# Patient Record
Sex: MALE | Race: WHITE | NOT HISPANIC OR LATINO | Employment: OTHER | URBAN - METROPOLITAN AREA
[De-identification: names, ages, dates, MRNs, and addresses within clinical notes are randomized per-mention and may not be internally consistent; named-entity substitution may affect disease eponyms.]

---

## 2022-12-05 ENCOUNTER — HOSPITAL ENCOUNTER (OUTPATIENT)
Dept: DATA CONVERSION | Facility: HOSPITAL | Age: 67
End: 2022-12-06
Attending: SURGERY | Admitting: SURGERY

## 2022-12-05 DIAGNOSIS — C22.7 OTHER SPECIFIED CARCINOMAS OF LIVER (MULTI): ICD-10-CM

## 2022-12-05 DIAGNOSIS — I10 ESSENTIAL (PRIMARY) HYPERTENSION: ICD-10-CM

## 2022-12-05 DIAGNOSIS — E78.5 HYPERLIPIDEMIA, UNSPECIFIED: ICD-10-CM

## 2022-12-05 DIAGNOSIS — Z87.891 PERSONAL HISTORY OF NICOTINE DEPENDENCE: ICD-10-CM

## 2022-12-05 DIAGNOSIS — R93.2 ABNORMAL FINDINGS ON DIAGNOSTIC IMAGING OF LIVER AND BILIARY TRACT: ICD-10-CM

## 2022-12-05 DIAGNOSIS — C24.1 MALIGNANT NEOPLASM OF AMPULLA OF VATER (MULTI): ICD-10-CM

## 2022-12-05 DIAGNOSIS — C78.7 SECONDARY MALIGNANT NEOPLASM OF LIVER AND INTRAHEPATIC BILE DUCT (MULTI): ICD-10-CM

## 2022-12-05 LAB
POCT GLUCOSE: 107 MG/DL (ref 74–99)
POCT GLUCOSE: 142 MG/DL (ref 74–99)
POCT GLUCOSE: 280 MG/DL (ref 74–99)

## 2022-12-06 LAB
ALBUMIN (G/DL) IN SER/PLAS: 4.1 G/DL (ref 3.4–5)
ANION GAP IN SER/PLAS: 16 MMOL/L (ref 10–20)
BASOPHILS (10*3/UL) IN BLOOD BY AUTOMATED COUNT: 0.03 X10E9/L (ref 0–0.1)
BASOPHILS/100 LEUKOCYTES IN BLOOD BY AUTOMATED COUNT: 0.2 % (ref 0–2)
CALCIUM (MG/DL) IN SER/PLAS: 9.5 MG/DL (ref 8.6–10.6)
CARBON DIOXIDE, TOTAL (MMOL/L) IN SER/PLAS: 25 MMOL/L (ref 21–32)
CHLORIDE (MMOL/L) IN SER/PLAS: 98 MMOL/L (ref 98–107)
CREATININE (MG/DL) IN SER/PLAS: 1.13 MG/DL (ref 0.5–1.3)
EOSINOPHILS (10*3/UL) IN BLOOD BY AUTOMATED COUNT: 0.01 X10E9/L (ref 0–0.7)
EOSINOPHILS/100 LEUKOCYTES IN BLOOD BY AUTOMATED COUNT: 0.1 % (ref 0–6)
ERYTHROCYTE DISTRIBUTION WIDTH (RATIO) BY AUTOMATED COUNT: 13.7 % (ref 11.5–14.5)
ERYTHROCYTE MEAN CORPUSCULAR HEMOGLOBIN CONCENTRATION (G/DL) BY AUTOMATED: 34.3 G/DL (ref 32–36)
ERYTHROCYTE MEAN CORPUSCULAR VOLUME (FL) BY AUTOMATED COUNT: 95 FL (ref 80–100)
ERYTHROCYTES (10*6/UL) IN BLOOD BY AUTOMATED COUNT: 3.73 X10E12/L (ref 4.5–5.9)
GFR MALE: 71 ML/MIN/1.73M2
GLUCOSE (MG/DL) IN SER/PLAS: 324 MG/DL (ref 74–99)
HEMATOCRIT (%) IN BLOOD BY AUTOMATED COUNT: 35.3 % (ref 41–52)
HEMOGLOBIN (G/DL) IN BLOOD: 12.1 G/DL (ref 13.5–17.5)
IMMATURE GRANULOCYTES/100 LEUKOCYTES IN BLOOD BY AUTOMATED COUNT: 0.5 % (ref 0–0.9)
LEUKOCYTES (10*3/UL) IN BLOOD BY AUTOMATED COUNT: 13.7 X10E9/L (ref 4.4–11.3)
LYMPHOCYTES (10*3/UL) IN BLOOD BY AUTOMATED COUNT: 1.08 X10E9/L (ref 1.2–4.8)
LYMPHOCYTES/100 LEUKOCYTES IN BLOOD BY AUTOMATED COUNT: 7.9 % (ref 13–44)
MAGNESIUM (MG/DL) IN SER/PLAS: 2 MG/DL (ref 1.6–2.4)
MONOCYTES (10*3/UL) IN BLOOD BY AUTOMATED COUNT: 0.88 X10E9/L (ref 0.1–1)
MONOCYTES/100 LEUKOCYTES IN BLOOD BY AUTOMATED COUNT: 6.4 % (ref 2–10)
NEUTROPHILS (10*3/UL) IN BLOOD BY AUTOMATED COUNT: 11.67 X10E9/L (ref 1.2–7.7)
NEUTROPHILS/100 LEUKOCYTES IN BLOOD BY AUTOMATED COUNT: 84.9 % (ref 40–80)
NRBC (PER 100 WBCS) BY AUTOMATED COUNT: 0 /100 WBC (ref 0–0)
PHOSPHATE (MG/DL) IN SER/PLAS: 4.2 MG/DL (ref 2.5–4.9)
PLATELETS (10*3/UL) IN BLOOD AUTOMATED COUNT: 290 X10E9/L (ref 150–450)
POCT GLUCOSE: 305 MG/DL (ref 74–99)
POTASSIUM (MMOL/L) IN SER/PLAS: 4.5 MMOL/L (ref 3.5–5.3)
SODIUM (MMOL/L) IN SER/PLAS: 134 MMOL/L (ref 136–145)
UREA NITROGEN (MG/DL) IN SER/PLAS: 21 MG/DL (ref 6–23)

## 2022-12-15 LAB
COMPLETE PATHOLOGY REPORT: NORMAL
CONVERTED ADDENDUM DIAGNOSIS 2: NORMAL
CONVERTED CLINICAL DIAGNOSIS-HISTORY: NORMAL
CONVERTED FINAL DIAGNOSIS: NORMAL
CONVERTED FINAL REPORT PDF LINK TO COPY AND PASTE: NORMAL
CONVERTED GROSS DESCRIPTION: NORMAL
CONVERTED INTRAOPERATIVE DIAGNOSIS: NORMAL

## 2023-03-01 NOTE — H&P
History & Physical Reviewed:   I have reviewed the History and Physical dated:  22-Nov-2022   History and Physical reviewed and relevant findings noted. Patient examined to review pertinent physical  findings.: No significant changes   Home Medications Reviewed: no changes noted   Allergies Reviewed: no changes noted       ERAS (Enhanced Recovery After Surgery):  ·  ERAS Patient: no     Consent:   COVID-19 Consent:  ·  COVID-19 Risk Consent Surgeon has reviewed key risks related to the risk of jose COVID-19 and if they contract COVID-19 what the risks are.     Attestation:   Note Completion:  I am a:  Resident/Fellow   Attending Attestation I saw and evaluated the patient.  I personally obtained the key and critical portions of the history and physical exam or was physically present for key and  critical portions performed by the resident/fellow. I reviewed the resident/fellow?s documentation and discussed the patient with the resident/fellow.  I agree with the resident/fellow?s medical decision making as documented in the note.     I personally evaluated the patient on 05-Dec-2022         Electronic Signatures:  Vika Watkins (Resident))  (Signed 05-Dec-2022 06:34)   Authored: History & Physical Reviewed, ERAS, Consent,  Note Completion  Jose Manuel Bar)  (Signed 06-Dec-2022 07:04)   Authored: Note Completion   Co-Signer: History & Physical Reviewed, ERAS, Consent, Note Completion      Last Updated: 06-Dec-2022 07:04 by Jose Manuel Bar)

## 2023-03-01 NOTE — PROGRESS NOTES
Consult Type: subsequent visit/care     Service: Anesthesia - Pain     Subjective Data:   SWATI BUTT is a 67 year old Male who is Hospital Day # 2 and POD #1 for Diagnostic laparoscope with liver biopsy.     Postop Pain HPI -   Palliative: relieved with IV analgesics and regional local anesthetics  Provocative: movement  Quality:  burning and aching  Radiation:  none  Severity:  5/10  Timing: constant    24-HOUR OPIOID CONSUMPTION: Tramadol 50 mg and oxycodone 15 mg.    Overnight Events: Patient had an uneventful night.     Objective Data:     Objective Information:        T   P  R  BP   MAP  SpO2   Value  36.3  77  18  122/74      93%  Date/Time 12/6 5:23 12/6 5:23 12/6 5:23 12/6 5:23    12/6 5:23  Range  (36.3C - 36.6C )  (77 - 100 )  (18 - 18 )  (120 - 165 )/ (61 - 92 )    (91% - 96% )        Pain reported at 12/5 22:07: 2 = Mild    Physical Exam Narrative:  ·  Physical Exam:    Physical Exam  Constitutional: Well developed, no distress, alert and cooperative  Skin: Warm and dry, no lesions, no rashes  Eyes: EOMI, clear sclera  ENMT: mucous membranes moist, no apparent injury, no lesions seen  Head/Neck: Neck supple, no apparent injury  Respiratory/Thorax: Breathing comfortably on room air  Cardiovascular: Regular, rate and rhythm, 2+ equal pulses of the extremities  Gastrointestinal: Non-distended, soft, non-tender, no rebound tenderness or guarding. QL catheters in place.   Extremities: Normal extremities, no cyanosis, edema, contusions or wounds  Neurological: Alert and oriented x3, intact senses  Psychological: Appropriate mood and behavior     Medication:    Medications:          Continuous Medications       --------------------------------    1. Ropivacaine 0.2%/ Ambit Pump 500 mL:  1000  mg  Peripheral Nerve  <Continuous>         Scheduled Medications       --------------------------------    1. Acetaminophen:  650  mg  Oral  Every 6 Hours    2. Aspirin Enteric Coated:  81  mg  Oral  Daily    3.  Atorvastatin:  20  mg  Oral  Daily    4. Enoxaparin SubCutaneous:  40  mg  SubCutaneous  Every 24 Hours    5. Insulin Lispro Mild Corrective Scale:  unit(s)  SubCutaneous  3 Times a Day Before Meals    6. Loratadine:  10  mg  Oral  Daily    7. Losartan:  50  mg  Oral  Daily    8. Pneumococcal 23-Valent (PNEUMOVAX) Vaccine:  0.5  mL  IntraMuscular  Once         PRN Medications       --------------------------------    1. Dextrose 50% in Water Injectable:  25  gram(s)  IntraVenous Push  Every 15 Minutes    2. Glucagon Injectable:  1  mg  IntraMuscular  Every 15 Minutes    3. Ondansetron Injectable:  4  mg  IntraVenous Push  Every 6 Hours    4. oxyCODONE Immediate Release:  5  mg  Oral  Every 4 Hours    5. traMADol:  50  mg  Oral  Every 6 Hours        Assessment and Plan:   Code Status:  ·  Code Status Full Code     Assessment:    SWATI BUTT is a 67 year old Male with history of bile duct cancer who presented 12/5 for diagnostic laparoscope with liver biopsy with Dr. Bar. Acute Pain  consulted for block for postoperative pain control.     - Bilateral QL blocks/catheters performed preoperatively on 12/5   - Ropivacaine via ambit to be set at 7cc/hr per side  - Pain medications per primary team  - Patient seen and examined on POD#1 -> pain well controlled. Removed catheters at bedside in anticipation of discharge today.   - Will sign off at this time. Thank you for consult.     Acute Pain Resident  pg [phone] ph [phone]       Attestation:   Note Completion:  I am a:  Resident/Fellow   Attending Attestation I saw and evaluated the patient.  I personally obtained the key and critical portions of the history and physical exam or was physically present for key and  critical portions performed by the resident/fellow. I reviewed the resident/fellow?s documentation and discussed the patient with the resident/fellow.  I agree with the resident/fellow?s medical decision making as documented in the note.     I personally  evaluated the patient on 06-Dec-2022         Electronic Signatures:  Saritha Barry)  (Signed 06-Dec-2022 11:29)   Authored: Note Completion   Co-Signer: Service, Subjective Data, Objective Data, Assessment and Plan  Austin Jeronimo (MD (Resident))  (Signed 06-Dec-2022 05:58)   Authored: Service, Subjective Data, Objective Data, Assessment  and Plan, Note Completion  Josef Alfredo ( (Resident))  (Signed 06-Dec-2022 07:59)   Authored: Service, Subjective Data, Objective Data, Assessment  and Plan      Last Updated: 06-Dec-2022 11:29 by Saritha Barry)

## 2024-03-06 NOTE — CONSULTS
Service:   Service: Anesthesia - Pain     Consult:  Consult requested by (Attending Name): Dr. Bar   Reason: post-operative analgesia     History of Present Illness:   Admission Reason: s/p peek n go laparoscopy, whipple   HPI:    SWATI BUTT is a 67 year old Male with history of bile duct cancer who presents today for peek n go laparoscopy, whipple with Dr. Bar. Acute Pain consulted  for block for postoperative pain control.     Anticipated Postop Pain Issues -   Palliative: typically relieved with IV analgesics and regional local anesthetics  Provocative: typically with movement  Quality: typically burning and aching  Radiation: typically none  Severity: typically severe 8-10/10  Timing: typically constant    PMH: HTN, HLD, DMII  PSH: ERCPx2, knee arthroscopy, removal of back cyst   FHx: hypertension, diabetes, cancer  SHx: former smoker, denies alcohol use, denies illicit drug use   Allergies: Doxycycline     Review Family/Social History and ROS:   Family History:  ·   FH: prostate cancer: Relationship to Patient: Brother (Age at Diagnosis: Age Unknown), Adopted: No, Twin/Multiple: No  ·   FH: brain cancer: Relationship to Patient: Sister (Age at Diagnosis: Age Unknown), Adopted: No, Twin/Multiple: No  ·   FH: throat cancer: Relationship to Patient: Sister (Age at Diagnosis: Age Unknown), Adopted: No, Twin/Multiple: No  ·   Family history of pancreatic cancer: Relationship to Patient: Mother (Age at Diagnosis: Age Unknown), Adopted: No, Twin/Multiple: No  ·   FH: colon cancer: Relationship to Patient: Mother (Age at Diagnosis: Age Unknown), Adopted: No, Twin/Multiple: No    Constitutional: POSITIVE: Weight Loss     Eyes: NEGATIVE: Blurry Vision     ENMT: NEGATIVE: Nasal Congestion     Respiratory: NEGATIVE: Shortness of Breath     Cardiac: NEGATIVE: Chest Pain     Gastrointestinal: NEGATIVE: Abdominal Pain     Musculoskeletal: NEGATIVE: Weakness     Neurological: NEGATIVE: Headache     Psychiatric:  NEGATIVE: Anxiety     Skin: NEGATIVE: Pruritus     Hematologic/Lymph: NEGATIVE: Bruising              Allergies:  ·  doxycycline : Rash, Itching    Objective:   Physical Exam Narrative:  ·  Physical Exam:    Physical Exam  Constitutional: Well developed, no distress, alert and cooperative  Skin: Warm and dry, no lesions, no rashes  Eyes: EOMI, clear sclera  ENMT: mucous membranes moist, no apparent injury, no lesions seen  Head/Neck: Neck supple, no apparent injury  Respiratory/Thorax: Breathing comfortably on room air  Cardiovascular: Regular, rate and rhythm, 2+ equal pulses of the extremities  Gastrointestinal: Non-distended, soft, non-tender, no rebound tenderness or guarding  Extremities: Normal extremities, no cyanosis, edema, contusions or wounds  Neurological: Alert and oriented x3, intact senses  Psychological: Appropriate mood and behavior       Assessment:    SWATI BUTT is a 67 year old Male with history of bile duct cancer who presents today for peek n go laparoscopy, whipple with Dr. Bar. Acute Pain consulted  for block for postoperative pain control.     - Bilateral QL blocks/catheters performed preoperatively on 12/5   - Ropivacaine via ambit to be set at 7cc/hr per side  - Please avoid Lidoderm patches while catheters infusing  - Pain medications per primary team  - Will see on POD1 & while catheter remain in place    Acute Pain Resident  pg [phone] ph [phone]       Attestation:   Note Completion:  I am a:  Resident/Fellow   Attending Attestation I saw and evaluated the patient.  I personally obtained the key and critical portions of the history and physical exam or was physically present for key and  critical portions performed by the resident/fellow. I reviewed the resident/fellow?s documentation and discussed the patient with the resident/fellow.  I agree with the resident/fellow?s medical decision making as documented in the note.     I personally evaluated the patient on 05-Dec-2022          Electronic Signatures:  Saritha Barry)  (Signed 05-Dec-2022 09:23)   Authored: Assessment/Recommendations, Note Completion   Co-Signer: Service, History of Present Illness, Review Family/Social History and ROS, Allergies, Objective, Assessment/Recommendations,  Note Completion  Josef Alfredo (DO (Resident))  (Signed 05-Dec-2022 08:22)   Authored: Service, History of Present Illness, Review  Family/Social History and ROS, Allergies, Objective, Assessment/Recommendations, Note Completion      Last Updated: 05-Dec-2022 09:23 by Saritha Barry)

## 2024-04-19 NOTE — OP NOTE
PROCEDURE DETAILS    Preoperative Diagnosis:  Possible cholangiocarcinoma  Postoperative Diagnosis:  Possible cholangiocarcinoma metastatic to liver  Surgeon: Dr. Bar  Resident/Fellow/Other Assistant: Vika Watkins    Procedure:  Diagnostic laparoscope with liver biopsy  Anesthesia: General  Estimated Blood Loss: 5ml  Blood Replaced: None  Findings: Frozen section of liver lesion positive for cancer   Specimens(s) Collected: yes,  Liver biopsy   Complications: None  IV Fluids: 1000ml crystalloid  Urine Output: 100ml  Additional Details: Plan:  - observe overnight  Patient Returned To/Condition: Extubated to PACU        Operative Report:   Patient was properly ID'd in preop holding and brought to the OR where he was placed on the OR table in supine position with arms out. All pressure points were  padded appropriately. Following induction of GETA, patient's abdomen was prepped & draped in standard sterile fashion. A time out confirmed the correct patient and procedure. The peritoneum was entered in the upper midline in modified Jerrica fashion and  a 5mm trocar was placed. The abdomen was insufflated to 15mmHg. On laparoscopy, there was a suspicious mass in segment 4B of the liver. A second 5mm trocar was placed in the RUQ. Several biopsies of the lesion were taken using the cut biopsy forceps and  sent for frozen section. These were read as positive for adenocarcinoma. The procedure was terminated at this point.    Fascia at the supraumbilical port site was closed with 0 Vicryl in figure-of-eight fashion. Skin was closed with 4-0 Monocryl in subcuticular fashion.  Sponge/needle/instrument counts were correct x2. Patient was awakened from GETA and taken to PACU in satisfactory condition.                        Attestation:   Note Completion:  Attending Attestation I was present for the entire procedure    I am a: Resident/Fellow         Electronic Signatures:  Vika Watkins (MD (Resident))  (Signed 05-Dec-2022  09:49)   Authored: Post-Operative Note, Chart Review, Note Completion  Jose Manuel Bar)  (Signed 06-Dec-2022 07:12)   Authored: Post-Operative Note, Chart Review, Note Completion   Co-Signer: Post-Operative Note, Chart Review, Note Completion      Last Updated: 06-Dec-2022 07:12 by Jose Manuel Bar)